# Patient Record
Sex: MALE | Race: BLACK OR AFRICAN AMERICAN | NOT HISPANIC OR LATINO | Employment: STUDENT | URBAN - METROPOLITAN AREA
[De-identification: names, ages, dates, MRNs, and addresses within clinical notes are randomized per-mention and may not be internally consistent; named-entity substitution may affect disease eponyms.]

---

## 2023-02-03 ENCOUNTER — HOSPITAL ENCOUNTER (OUTPATIENT)
Dept: RADIOLOGY | Facility: HOSPITAL | Age: 28
Discharge: HOME | End: 2023-02-03
Attending: NURSE PRACTITIONER
Payer: MEDICAID

## 2023-02-03 ENCOUNTER — OFFICE VISIT (OUTPATIENT)
Dept: PRIMARY CARE CLINIC | Facility: CLINIC | Age: 28
End: 2023-02-03
Payer: MEDICAID

## 2023-02-03 VITALS
SYSTOLIC BLOOD PRESSURE: 118 MMHG | BODY MASS INDEX: 21.39 KG/M2 | TEMPERATURE: 98 F | OXYGEN SATURATION: 98 % | HEART RATE: 86 BPM | DIASTOLIC BLOOD PRESSURE: 78 MMHG | HEIGHT: 71 IN | WEIGHT: 152.81 LBS

## 2023-02-03 DIAGNOSIS — Z11.4 SCREENING FOR HIV (HUMAN IMMUNODEFICIENCY VIRUS): ICD-10-CM
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
## 2023-02-03 DIAGNOSIS — Z13.220 ENCOUNTER FOR LIPID SCREENING FOR CARDIOVASCULAR DISEASE: ICD-10-CM

## 2023-02-03 DIAGNOSIS — S69.92XA INJURY OF LEFT WRIST, INITIAL ENCOUNTER: ICD-10-CM

## 2023-02-03 DIAGNOSIS — M25.532 LEFT WRIST PAIN: Primary | ICD-10-CM

## 2023-02-03 DIAGNOSIS — Z86.39 HISTORY OF METABOLIC AND NUTRITIONAL DISORDER: ICD-10-CM

## 2023-02-03 DIAGNOSIS — S61.552D DOG BITE OF LEFT WRIST, SUBSEQUENT ENCOUNTER: ICD-10-CM

## 2023-02-03 DIAGNOSIS — Z00.00 GENERAL MEDICAL EXAM: ICD-10-CM

## 2023-02-03 DIAGNOSIS — Z13.6 ENCOUNTER FOR LIPID SCREENING FOR CARDIOVASCULAR DISEASE: ICD-10-CM

## 2023-02-03 DIAGNOSIS — M25.532 LEFT WRIST PAIN: ICD-10-CM

## 2023-02-03 DIAGNOSIS — Z11.59 ENCOUNTER FOR HEPATITIS C SCREENING TEST FOR LOW RISK PATIENT: ICD-10-CM

## 2023-02-03 PROCEDURE — 1159F PR MEDICATION LIST DOCUMENTED IN MEDICAL RECORD: ICD-10-PCS | Mod: CPTII,,, | Performed by: NURSE PRACTITIONER

## 2023-02-03 PROCEDURE — 99999 PR PBB SHADOW E&M-NEW PATIENT-LVL V: ICD-10-PCS | Mod: PBBFAC,,, | Performed by: NURSE PRACTITIONER

## 2023-02-03 PROCEDURE — 73110 X-RAY EXAM OF WRIST: CPT | Mod: 26,LT,, | Performed by: RADIOLOGY

## 2023-02-03 PROCEDURE — 99204 OFFICE O/P NEW MOD 45 MIN: CPT | Mod: S$PBB,,, | Performed by: NURSE PRACTITIONER

## 2023-02-03 PROCEDURE — 3008F PR BODY MASS INDEX (BMI) DOCUMENTED: ICD-10-PCS | Mod: CPTII,,, | Performed by: NURSE PRACTITIONER

## 2023-02-03 PROCEDURE — 3078F DIAST BP <80 MM HG: CPT | Mod: CPTII,,, | Performed by: NURSE PRACTITIONER

## 2023-02-03 PROCEDURE — 3078F PR MOST RECENT DIASTOLIC BLOOD PRESSURE < 80 MM HG: ICD-10-PCS | Mod: CPTII,,, | Performed by: NURSE PRACTITIONER

## 2023-02-03 PROCEDURE — 73110 X-RAY EXAM OF WRIST: CPT | Mod: TC,PN,LT

## 2023-02-03 PROCEDURE — 99204 PR OFFICE/OUTPT VISIT, NEW, LEVL IV, 45-59 MIN: ICD-10-PCS | Mod: S$PBB,,, | Performed by: NURSE PRACTITIONER

## 2023-02-03 PROCEDURE — 73110 XR WRIST COMPLETE 3 VIEWS LEFT: ICD-10-PCS | Mod: 26,LT,, | Performed by: RADIOLOGY

## 2023-02-03 PROCEDURE — 3008F BODY MASS INDEX DOCD: CPT | Mod: CPTII,,, | Performed by: NURSE PRACTITIONER

## 2023-02-03 PROCEDURE — 3074F PR MOST RECENT SYSTOLIC BLOOD PRESSURE < 130 MM HG: ICD-10-PCS | Mod: CPTII,,, | Performed by: NURSE PRACTITIONER

## 2023-02-03 PROCEDURE — 99205 OFFICE O/P NEW HI 60 MIN: CPT | Mod: PBBFAC,PN | Performed by: NURSE PRACTITIONER

## 2023-02-03 PROCEDURE — 3074F SYST BP LT 130 MM HG: CPT | Mod: CPTII,,, | Performed by: NURSE PRACTITIONER

## 2023-02-03 PROCEDURE — 99999 PR PBB SHADOW E&M-NEW PATIENT-LVL V: CPT | Mod: PBBFAC,,, | Performed by: NURSE PRACTITIONER

## 2023-02-03 PROCEDURE — 1159F MED LIST DOCD IN RCRD: CPT | Mod: CPTII,,, | Performed by: NURSE PRACTITIONER

## 2023-02-03 NOTE — PROGRESS NOTES
Subjective:       Patient ID: Rachelle Jara is a 27 y.o. male.    Chief Complaint: Establish Care and Hand Pain (left)      History of Present Illness:   Rachelle Jara 27 y.o. male presents today to establish care and reports left wrist and hand pain resulting from dog bite that occurred Jan 13, 2023. According to patient two of his dogs were fighting and he attempted to break them up when one latched on to his left wrist causing a dislocation and laceration. Patient reports that provider at [facility] wanted to do surgery but he refused at that time and left hospital AMA. He was given a splint and antibiotics to take. Will consult physical therapy and ortho. Now he is having problems with lifting and hold items in his left wrist. There is still a small laceration to area that seems to be healing without complications at this time. Treatment options and alternatives were discussed with the patient. Patient provided opportunity to ask additional questions.  All questions were answered. Voices understanding and acceptance of this advice. Instructed to call back if any further questions or concerns. I had the opportunity to review patient's medical records including care everywhere, labs, and medication reconciliation to determine medical decision making.       History reviewed. No pertinent past medical history.  Family History   Problem Relation Age of Onset    Hyperlipidemia Mother     Hypertension Mother     Hypertension Father     Stroke Maternal Grandfather     Hearing loss Paternal Grandmother     Diabetes Paternal Grandmother     Stroke Paternal Grandmother     Stroke Paternal Grandfather      Social History     Socioeconomic History    Marital status: Single   Tobacco Use    Smoking status: Never    Smokeless tobacco: Never   Substance and Sexual Activity    Alcohol use: Yes    Drug use: Never    Sexual activity: Not Currently     No outpatient encounter medications on file as of 2/3/2023.     No  "facility-administered encounter medications on file as of 2/3/2023.       Review of Systems    Objective:      /78 (BP Location: Left arm, Patient Position: Sitting, BP Method: Medium (Manual))   Pulse 86   Temp 97.5 °F (36.4 °C) (Temporal)   Ht 5' 11" (1.803 m)   Wt 69.3 kg (152 lb 12.8 oz)   SpO2 98%   BMI 21.31 kg/m²   Physical Exam  Musculoskeletal:      Right wrist: Normal.      Left wrist: Swelling, laceration, tenderness and bony tenderness present.        Hands:        No results found for this or any previous visit.  Assessment:       1. Left wrist pain    2. Injury of left wrist, initial encounter    3. General medical exam    4. Encounter for lipid screening for cardiovascular disease    5. Screening for HIV (human immunodeficiency virus)    6. Encounter for hepatitis C screening test for low risk patient    7. History of metabolic and nutritional disorder    8. Dog bite of left wrist, subsequent encounter          Plan:   Left wrist pain  -     Ambulatory referral/consult to Orthopedics; Future; Expected date: 02/10/2023  -     Ambulatory referral/consult to Physical/Occupational Therapy; Future; Expected date: 02/10/2023  -     Lipid Panel; Future; Expected date: 02/03/2023  -     X-Ray Wrist Complete Left; Future; Expected date: 02/03/2023  -     Ambulatory referral/consult to Orthopedics; Future; Expected date: 02/10/2023    Injury of left wrist, initial encounter  -     Ambulatory referral/consult to Orthopedics; Future; Expected date: 02/10/2023  -     Ambulatory referral/consult to Physical/Occupational Therapy; Future; Expected date: 02/10/2023  -     X-Ray Wrist Complete Left; Future; Expected date: 02/03/2023  -     Ambulatory referral/consult to Orthopedics; Future; Expected date: 02/10/2023    General medical exam  -     CBC Auto Differential; Future; Expected date: 02/03/2023  -     Comprehensive Metabolic Panel; Future; Expected date: 02/03/2023    Encounter for lipid screening " for cardiovascular disease    Screening for HIV (human immunodeficiency virus)  -     HIV 1/2 Ag/Ab (4th Gen); Future; Expected date: 02/03/2023    Encounter for hepatitis C screening test for low risk patient  -     Hepatitis C Antibody; Future; Expected date: 02/03/2023    History of metabolic and nutritional disorder  -     Hemoglobin A1C; Future; Expected date: 02/03/2023  -     TSH; Future; Expected date: 02/03/2023  -     T4, Free; Future; Expected date: 02/03/2023    Dog bite of left wrist, subsequent encounter  -     X-Ray Wrist Complete Left; Future; Expected date: 02/03/2023  -     Ambulatory referral/consult to Orthopedics; Future; Expected date: 02/10/2023             [facility] [address]  [place] [address]  Office [phone]  Fax [phone]

## 2023-07-05 ENCOUNTER — PATIENT MESSAGE (OUTPATIENT)
Dept: RESEARCH | Facility: HOSPITAL | Age: 28
End: 2023-07-05
Payer: MEDICAID

## 2024-07-09 ENCOUNTER — PATIENT MESSAGE (OUTPATIENT)
Dept: RESEARCH | Facility: HOSPITAL | Age: 29
End: 2024-07-09
Payer: MEDICAID